# Patient Record
Sex: FEMALE | Race: WHITE | NOT HISPANIC OR LATINO | Employment: STUDENT | URBAN - METROPOLITAN AREA
[De-identification: names, ages, dates, MRNs, and addresses within clinical notes are randomized per-mention and may not be internally consistent; named-entity substitution may affect disease eponyms.]

---
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
## 2022-09-19 DIAGNOSIS — Z30.41 ENCOUNTER FOR SURVEILLANCE OF CONTRACEPTIVE PILLS: ICD-10-CM

## 2022-09-19 DIAGNOSIS — N94.6 DYSMENORRHEA: ICD-10-CM

## 2022-09-19 NOTE — LETTER
September 26, 2022      Promise Crawford [place]  [address] [address]        We have been calling you regarding a recent refill request we received for Spronyx.  Unfortunately, we were unable to reach you.  We are notifying you that you are due for physical with fasting labs prior to your next refill.  You can schedule this appointment via [employer] or by calling the clinic at [phone] Option 1.            Sincerely,        Margo Johnson PA-C

## 2022-09-20 RX ORDER — LEVONORGESTREL AND ETHINYL ESTRADIOL 0.1-0.02MG
KIT ORAL
Qty: 84 TABLET | Refills: 0 | Status: SHIPPED | OUTPATIENT
Start: 2022-09-20 | End: 2023-05-10

## 2022-09-20 NOTE — TELEPHONE ENCOUNTER
Temporary re-fill as generic of same type received previously, pt is due for routine phyiscal. Please notify and assist with scheduling if needed.  Electronically Signed By: Margo Gibson PA-C

## 2022-11-20 ENCOUNTER — HEALTH MAINTENANCE LETTER (OUTPATIENT)
Age: 22
End: 2022-11-20

## 2023-03-23 ENCOUNTER — OFFICE VISIT (OUTPATIENT)
Dept: PEDIATRICS | Facility: CLINIC | Age: 23
End: 2023-03-23
Payer: COMMERCIAL

## 2023-03-23 ENCOUNTER — NURSE TRIAGE (OUTPATIENT)
Dept: NURSING | Facility: CLINIC | Age: 23
End: 2023-03-23

## 2023-03-23 VITALS
WEIGHT: 98.8 LBS | SYSTOLIC BLOOD PRESSURE: 114 MMHG | BODY MASS INDEX: 17.78 KG/M2 | TEMPERATURE: 98.5 F | DIASTOLIC BLOOD PRESSURE: 79 MMHG | HEART RATE: 94 BPM | OXYGEN SATURATION: 97 %

## 2023-03-23 DIAGNOSIS — R63.8 SYMPTOMS CONCERNING NUTRITION, METABOLISM, AND DEVELOPMENT: ICD-10-CM

## 2023-03-23 DIAGNOSIS — Z11.3 SCREENING FOR STDS (SEXUALLY TRANSMITTED DISEASES): ICD-10-CM

## 2023-03-23 DIAGNOSIS — N92.6 IRREGULAR PERIODS: ICD-10-CM

## 2023-03-23 DIAGNOSIS — Z30.9 ENCOUNTER FOR CONTRACEPTIVE MANAGEMENT, UNSPECIFIED TYPE: Primary | ICD-10-CM

## 2023-03-23 LAB
C TRACH DNA SPEC QL NAA+PROBE: NEGATIVE
HCG UR QL: NEGATIVE
N GONORRHOEA DNA SPEC QL NAA+PROBE: NEGATIVE

## 2023-03-23 PROCEDURE — 81025 URINE PREGNANCY TEST: CPT | Performed by: PEDIATRICS

## 2023-03-23 PROCEDURE — 99214 OFFICE O/P EST MOD 30 MIN: CPT | Performed by: PEDIATRICS

## 2023-03-23 PROCEDURE — 87491 CHLMYD TRACH DNA AMP PROBE: CPT | Performed by: PEDIATRICS

## 2023-03-23 PROCEDURE — 87591 N.GONORRHOEAE DNA AMP PROB: CPT | Performed by: PEDIATRICS

## 2023-03-23 RX ORDER — DROSPIRENONE AND ETHINYL ESTRADIOL 0.02-3(28)
1 KIT ORAL DAILY
Qty: 90 TABLET | Refills: 0 | Status: SHIPPED | OUTPATIENT
Start: 2023-03-23 | End: 2023-06-08

## 2023-03-23 NOTE — LETTER
March 27, 2023      Promise Corbett  [address] [address] [address]        Dear ,    We are writing to inform you of your test results.    Your test results fall within the expected range(s) or remain unchanged from previous results.  Please continue with current treatment plan.    Resulted Orders   HCG qualitative urine   Result Value Ref Range    hCG Urine Qualitative Negative Negative      Comment:      This test is for screening purposes.  Results should be interpreted along with the clinical picture.  Confirmation testing is available if warranted by ordering [id number], HCG Quantitative Pregnancy.   NEISSERIA GONORRHOEA PCR   Result Value Ref Range    Neisseria gonorrhoeae Negative Negative      Comment:      Negative for N. gonorrhoeae rRNA by transcription mediated amplification. A negative result by transcription mediated amplification does not preclude the presence of C. trachomatis infection because results are dependent on proper and adequate collection, absence of inhibitors and sufficient rRNA to be detected.   CHLAMYDIA TRACHOMATIS PCR   Result Value Ref Range    Chlamydia trachomatis Negative Negative      Comment:      A negative result by transcription mediated amplification does not preclude the presence of C. trachomatis infection because results are dependent on proper and adequate collection, absence of inhibitors and sufficient rRNA to be detected.       If you have any questions or concerns, please call the clinic at the number listed above.       Sincerely,      Vera Ayoub MD

## 2023-03-23 NOTE — PROGRESS NOTES
Wt Readings from Last 5 Encounters:   03/23/23 98 lb 12.8 oz (44.8 kg)   07/21/21 115 lb (52.2 kg) (25 %, Z= -0.69)*   12/03/20 109 lb (49.4 kg) (15 %, Z= -1.02)*   12/02/20 109 lb (49.4 kg) (15 %, Z= -1.02)*   07/13/20 108 lb (49 kg) (15 %, Z= -1.04)*     * Growth percentiles are based on CDC (Girls, 2-20 Years) data.     Assessment & Plan         Ordering of each unique test  Prescription drug management  30 minutes spent on the date of the encounter doing chart review, history and exam, documentation and further activities per the note        MEDICATIONS:  Continue current medications without change  FUTURE LABS:    Vera Ayoub MD  [facility]  Body mass index is 17.78 kg/m .    Subjective   Promise is a 23 year old, presenting for the following health issues:  Contraception  No flowsheet data found.  Contraception    History of Present Illness       Reason for visit:  Birth control    She eats 2-3 servings of fruits and vegetables daily.She consumes 1 sweetened beverage(s) daily.She exercises with enough effort to increase her heart rate 60 or more minutes per day.  She exercises with enough effort to increase her heart rate 5 days per week.   She is taking medications regularly.     Promise states that she would like to re-establish BCP for 2 reasns    1. Contraceoption. Sexually active  2, hx of irregular periods. Was regular on BCP          Review of Systems   Constitutional, HEENT, cardiovascular, pulmonary, gi and gu systems are negative, except as otherwise noted.      Objective    There were no vitals taken for this visit.  There is no height or weight on file to calculate BMI.  Physical Exam   GENERAL: healthy, alert and no distress  NECK: no adenopathy, no asymmetry, masses, or scars and thyroid normal to palpation  RESP: lungs clear to auscultation - no rales, rhonchi or wheezes  CV: regular rate and rhythm, normal S1 S2, no S3 or S4, no murmur, click or rub, no  peripheral edema and peripheral pulses strong  ABDOMEN: soft, nontender, no hepatosplenomegaly, no masses and bowel sounds normal  MS: no gross musculoskeletal defects noted, no edema    Office Visit on 07/21/2021   Component Date Value Ref Range Status     Neisseria gonorrhoeae 07/21/2021 Negative  Negative Final    Negative for N. gonorrhoeae rRNA by transcription mediated amplification. A negative result by transcription mediated amplification does not preclude the presence of C. trachomatis infection because results are dependent on proper and adequate collection, absence of inhibitors and sufficient rRNA to be detected.     Chlamydia trachomatis 07/21/2021 Negative  Negative Final

## 2023-03-23 NOTE — TELEPHONE ENCOUNTER
Patient wants to restart her birth control and mom is wanting an appointment today.    She has been having spotting since June of 2022    The spotting comes and goes and seems to be random    It tends to be pinkish/brown and it will be present when she wiping    No pain with urination    She is sexual active and they are using condoms    Has not checked a pregnancy test    No abdominal pain    Patient should be seen today    Per protocol patient should be seen today    Yeimy Alvares RN  [facility] Nurse Advisor  8:17 AM  3/23/2023        Reason for Disposition    Patient wants to be seen    Additional Information    Negative: Sounds like a life-threatening emergency to the triager    Negative: Patient sounds very sick or weak to the triager    Protocols used: MENSTRUAL PERIOD - MISSED OR LATE-A-OH

## 2023-05-09 ENCOUNTER — NURSE TRIAGE (OUTPATIENT)
Dept: NURSING | Facility: CLINIC | Age: 23
End: 2023-05-09
Payer: COMMERCIAL

## 2023-05-09 DIAGNOSIS — N94.6 DYSMENORRHEA: ICD-10-CM

## 2023-05-09 DIAGNOSIS — Z30.41 ENCOUNTER FOR SURVEILLANCE OF CONTRACEPTIVE PILLS: ICD-10-CM

## 2023-05-10 NOTE — TELEPHONE ENCOUNTER
Pt called requesting Rx for SRONYX 0.1-20 MG-MCG tablet. Reports she is having acne like big welts, feels fatigue and is argueta from current birth control  drospirenone-ethinyl estradiol (JOSIE) 3-0.02 MG tablet. She wants to change birth control SRONYX. She is in [place]. Would provider approve Rx? Should she see a local provider to discuss Rx change?     Rx and preferred pharmacy pended. Pt is requesting a call back with providers response at [phone]. Ok to leave a detailed voice message.

## 2023-05-10 NOTE — TELEPHONE ENCOUNTER
Ok for refill on  previous medication (BCP)    Recommend to be seen locally if Promise has any additional  concern or questions

## 2023-05-11 RX ORDER — LEVONORGESTREL/ETHIN.ESTRADIOL 0.1-0.02MG
1 TABLET ORAL DAILY
Qty: 84 TABLET | Refills: 0 | Status: SHIPPED | OUTPATIENT
Start: 2023-05-11 | End: 2023-10-17

## 2023-05-11 NOTE — TELEPHONE ENCOUNTER
Called and spoke with pt. Relayed Dr Pollard message. Pt will ask pharmacist how to switch from the JOSIE to Sronyx. No other questions at this time.

## 2023-06-07 DIAGNOSIS — Z30.9 ENCOUNTER FOR CONTRACEPTIVE MANAGEMENT, UNSPECIFIED TYPE: ICD-10-CM

## 2023-06-07 DIAGNOSIS — N92.6 IRREGULAR PERIODS: ICD-10-CM

## 2023-06-08 RX ORDER — DROSPIRENONE AND ETHINYL ESTRADIOL 0.02-3(28)
KIT ORAL
Qty: 84 TABLET | Refills: 2 | Status: SHIPPED | OUTPATIENT
Start: 2023-06-08

## 2023-06-08 NOTE — TELEPHONE ENCOUNTER
Prescription approved per [facility] Refill Protocol.  Jen Cast, RN  [facility] Triage Nurse

## 2023-10-17 DIAGNOSIS — N94.6 DYSMENORRHEA: ICD-10-CM

## 2023-10-17 DIAGNOSIS — Z30.41 ENCOUNTER FOR SURVEILLANCE OF CONTRACEPTIVE PILLS: ICD-10-CM

## 2023-10-17 RX ORDER — LEVONORGESTREL AND ETHINYL ESTRADIOL 0.1-0.02MG
KIT ORAL
Qty: 28 TABLET | Refills: 0 | Status: SHIPPED | OUTPATIENT
Start: 2023-10-17 | End: 2023-11-01

## 2023-11-01 ENCOUNTER — MYC REFILL (OUTPATIENT)
Dept: FAMILY MEDICINE | Facility: CLINIC | Age: 23
End: 2023-11-01
Payer: COMMERCIAL

## 2023-11-01 DIAGNOSIS — Z30.41 ENCOUNTER FOR SURVEILLANCE OF CONTRACEPTIVE PILLS: ICD-10-CM

## 2023-11-01 DIAGNOSIS — N94.6 DYSMENORRHEA: ICD-10-CM

## 2023-11-02 RX ORDER — LEVONORGESTREL AND ETHINYL ESTRADIOL 0.1-0.02MG
1 KIT ORAL DAILY
Qty: 84 TABLET | OUTPATIENT
Start: 2023-11-02

## 2023-11-02 RX ORDER — LEVONORGESTREL/ETHIN.ESTRADIOL 0.1-0.02MG
1 TABLET ORAL DAILY
Qty: 28 TABLET | Refills: 0 | Status: SHIPPED | OUTPATIENT
Start: 2023-11-02 | End: 2023-11-24

## 2023-11-23 DIAGNOSIS — N94.6 DYSMENORRHEA: ICD-10-CM

## 2023-11-23 DIAGNOSIS — Z30.41 ENCOUNTER FOR SURVEILLANCE OF CONTRACEPTIVE PILLS: ICD-10-CM

## 2023-11-24 RX ORDER — LEVONORGESTREL AND ETHINYL ESTRADIOL 0.1-0.02MG
1 KIT ORAL DAILY
Qty: 84 TABLET | Refills: 1 | Status: SHIPPED | OUTPATIENT
Start: 2023-11-24 | End: 2024-05-30

## 2023-11-25 ENCOUNTER — HEALTH MAINTENANCE LETTER (OUTPATIENT)
Age: 23
End: 2023-11-25

## 2024-05-30 ENCOUNTER — VIRTUAL VISIT (OUTPATIENT)
Dept: FAMILY MEDICINE | Facility: CLINIC | Age: 24
End: 2024-05-30
Payer: COMMERCIAL

## 2024-05-30 DIAGNOSIS — N94.6 DYSMENORRHEA: ICD-10-CM

## 2024-05-30 DIAGNOSIS — Z30.41 ENCOUNTER FOR SURVEILLANCE OF CONTRACEPTIVE PILLS: ICD-10-CM

## 2024-05-30 PROCEDURE — 99213 OFFICE O/P EST LOW 20 MIN: CPT | Mod: 95 | Performed by: NURSE PRACTITIONER

## 2024-05-30 RX ORDER — LEVONORGESTREL/ETHIN.ESTRADIOL 0.1-0.02MG
1 TABLET ORAL DAILY
Qty: 90 TABLET | Refills: 3 | Status: SHIPPED | OUTPATIENT
Start: 2024-05-30

## 2024-05-30 NOTE — PROGRESS NOTES
"Promise is a 24 year old who is being evaluated via a billable video visit.    How would you like to obtain your AVS? MyChart  If the video visit is dropped, the invitation should be resent by: Text to cell phone: [phone]  Will anyone else be joining your video visit? No    Assessment & Plan     Promise was seen today for recheck medication.    Diagnoses and all orders for this visit:    Dysmenorrhea  Encounter for surveillance of contraceptive pills  Stable, doing well on oral contraceptives.    -     levonorgestrel-ethinyl estradiol (LUTERA) 0.1-20 MG-MCG tablet; Take 1 tablet by mouth daily    Other orders  -     REVIEW OF HEALTH MAINTENANCE PROTOCOL ORDERS  -     PRIMARY CARE FOLLOW-UP SCHEDULING; Future    Return in about 2 months (around 7/30/2024) for Preventative Visit + Pap Smear , In Clinic.      Subjective   Promise is a 24 year old, presenting for the following health issues:  Recheck Medication (Birth Control Refill)        5/30/2024    12:07 PM   Additional Questions   Roomed by HERON Emery   Accompanied by Self     HPI     Patient reports need for birth control refill.  Has been on oral contraceptives since 8th grade.      Works in [employer] at a [place] in [place], does like to receive care here in [place] and is willing to schedule preventative visit during next trip to [place].      Review of Systems  Constitutional, HEENT, cardiovascular, pulmonary, gi and gu systems are negative, except as otherwise noted.      Objective    Vitals - Patient Reported  Weight (Patient Reported): 49.9 kg (110 lb)  Height (Patient Reported): 157.5 cm (5' 2\")  BMI (Based on Pt Reported Ht/Wt): 20.12        Physical Exam   GENERAL: alert and no distress  EYES: Eyes grossly normal to inspection.  No discharge or erythema, or obvious scleral/conjunctival abnormalities.  RESP: No audible wheeze, cough, or visible cyanosis.    SKIN: Visible skin clear. No significant rash, abnormal pigmentation or lesions.  NEURO: Cranial " nerves grossly intact.  Mentation and speech appropriate for age.  PSYCH: Appropriate affect, tone, and pace of words          Video-Visit Details    Type of service:  Video Visit   Originating Location (pt. Location): Home  Distant Location (provider location):  On-site  Platform used for Video Visit: Bartolo      Signed Electronically by: BETH Ulloa CNP

## 2024-06-03 NOTE — TELEPHONE ENCOUNTER
Letter sent to bronson Tim CMA         You can access the FollowMyHealth Patient Portal offered by [facility] by registering at the following website: [email or web address]. By joining [facility]’s FollowMyHealth portal, you will also be able to view your health information using other applications (apps) compatible with our system.

## 2024-12-29 ENCOUNTER — HEALTH MAINTENANCE LETTER (OUTPATIENT)
Age: 24
End: 2024-12-29

## 2025-02-26 DIAGNOSIS — N94.6 DYSMENORRHEA: ICD-10-CM

## 2025-02-26 DIAGNOSIS — Z30.41 ENCOUNTER FOR SURVEILLANCE OF CONTRACEPTIVE PILLS: ICD-10-CM

## 2025-02-26 RX ORDER — LEVONORGESTREL/ETHIN.ESTRADIOL 0.1-0.02MG
1 TABLET ORAL DAILY
Qty: 90 TABLET | Refills: 0 | Status: SHIPPED | OUTPATIENT
Start: 2025-02-26

## 2025-02-26 NOTE — TELEPHONE ENCOUNTER
Patient calls and states she needs BC refill resent to [facility] in [place] because her pharmacy at [facility] doesn't have the correct BC in stock. Patient has remaining refill on file for Feb-May.    Writer switched pharmacy. Advised patient she needs to be seen prior to future refills.    Thank you,  Heber, Triage RN [facility] [place]    12:27 PM 2/26/2025

## 2025-04-16 ENCOUNTER — OFFICE VISIT (OUTPATIENT)
Dept: FAMILY MEDICINE | Facility: CLINIC | Age: 25
End: 2025-04-16
Payer: COMMERCIAL

## 2025-04-16 VITALS
HEART RATE: 89 BPM | SYSTOLIC BLOOD PRESSURE: 122 MMHG | TEMPERATURE: 97.7 F | OXYGEN SATURATION: 99 % | WEIGHT: 102 LBS | HEIGHT: 63 IN | RESPIRATION RATE: 18 BRPM | DIASTOLIC BLOOD PRESSURE: 78 MMHG | BODY MASS INDEX: 18.07 KG/M2

## 2025-04-16 DIAGNOSIS — Z30.41 ENCOUNTER FOR SURVEILLANCE OF CONTRACEPTIVE PILLS: ICD-10-CM

## 2025-04-16 DIAGNOSIS — N94.6 DYSMENORRHEA: Primary | ICD-10-CM

## 2025-04-16 PROCEDURE — G2211 COMPLEX E/M VISIT ADD ON: HCPCS | Performed by: FAMILY MEDICINE

## 2025-04-16 PROCEDURE — 3078F DIAST BP <80 MM HG: CPT | Performed by: FAMILY MEDICINE

## 2025-04-16 PROCEDURE — 3074F SYST BP LT 130 MM HG: CPT | Performed by: FAMILY MEDICINE

## 2025-04-16 PROCEDURE — 99213 OFFICE O/P EST LOW 20 MIN: CPT | Performed by: FAMILY MEDICINE

## 2025-04-16 RX ORDER — LEVONORGESTREL/ETHIN.ESTRADIOL 0.1-0.02MG
1 TABLET ORAL DAILY
Qty: 90 TABLET | Refills: 3 | Status: SHIPPED | OUTPATIENT
Start: 2025-04-16

## 2025-04-16 NOTE — Clinical Note
2025    Promise Corbett   2000        To Whom it May Concern;    Please excuse Promise Corbett from work/school for a healthcare visit on 2025.    Sincerely,        Kan Jones, DO

## 2025-04-16 NOTE — LETTER
April 16, 2025      Promise Crawford [place]  [address] [facility] [facility] [address]        To Whom It May Concern:        Promise Corbett was seen in our clinic on 4/16/2024      Sincerely,        Kan Jones, DO    Electronically signed

## 2025-04-16 NOTE — PROGRESS NOTES
"  Assessment & Plan     Dysmenorrhea    - levonorgestrel-ethinyl estradiol (LUTERA) 0.1-20 MG-MCG tablet; Take 1 tablet by mouth daily.    Encounter for surveillance of contraceptive pills  - levonorgestrel-ethinyl estradiol (LUTERA) 0.1-20 MG-MCG tablet; Take 1 tablet by mouth daily.      Doing well with current medication. Refill signed. Advised setting up preventive visit/pap smear next time she is in town and to consider HPV vaccine as well.    Subjective   Promise is a 25 year old, presenting for the following health issues:    Recheck Medication        4/16/2025     8:42 AM   Additional Questions   Roomed by Fifi DUKES CMA       History of Present Illness       Reason for visit:  Refill She is missing 1 dose(s) of medications per week.  She is not taking prescribed medications regularly due to remembering to take.      Patient is here for refills of her birth control medication. She has been taking the levonorgestrel-es    Was off OCP for a week and a half last month -- had longer period as a result. Period just ended.      Review of Systems  Constitutional, HEENT, cardiovascular, pulmonary, gi and gu systems are negative, except as otherwise noted.      Objective    /78   Pulse 89   Temp 97.7  F (36.5  C) (Tympanic)   Resp 18   Ht 1.588 m (5' 2.5\")   Wt 46.3 kg (102 lb)   LMP 04/09/2025 (Approximate)   SpO2 99%   BMI 18.36 kg/m    Body mass index is 18.36 kg/m .    Physical Exam   GENERAL: alert and no distress  PSYCH: mentation appears normal, affect normal/bright        The longitudinal plan of care for the diagnosis(es)/condition(s) as documented were addressed during this visit. Due to the added complexity in care, I will continue to support Promise in the subsequent management and with ongoing continuity of care.      Signed Electronically by: Kan Jones DO  "

## 2025-05-15 DIAGNOSIS — Z30.41 ENCOUNTER FOR SURVEILLANCE OF CONTRACEPTIVE PILLS: ICD-10-CM

## 2025-05-15 DIAGNOSIS — N94.6 DYSMENORRHEA: ICD-10-CM

## 2025-05-15 RX ORDER — LEVONORGESTREL AND ETHINYL ESTRADIOL 0.1-0.02MG
1 KIT ORAL
Qty: 84 TABLET | Refills: 3 | Status: SHIPPED | OUTPATIENT
Start: 2025-05-15

## 2025-05-15 NOTE — TELEPHONE ENCOUNTER
Failed protocol - pt has refills but is asking for refills to be sent to a different pharmacy     Please lou   Thank you